# Patient Record
Sex: FEMALE | Race: WHITE | NOT HISPANIC OR LATINO | Employment: OTHER | URBAN - METROPOLITAN AREA
[De-identification: names, ages, dates, MRNs, and addresses within clinical notes are randomized per-mention and may not be internally consistent; named-entity substitution may affect disease eponyms.]

---

## 2022-07-11 PROBLEM — E78.5 DYSLIPIDEMIA: Status: ACTIVE | Noted: 2022-07-11

## 2022-07-11 PROBLEM — I10 ESSENTIAL HYPERTENSION: Status: ACTIVE | Noted: 2022-07-11

## 2022-07-11 PROBLEM — I48.0 AF (PAROXYSMAL ATRIAL FIBRILLATION): Status: ACTIVE | Noted: 2022-07-11

## 2022-08-12 ENCOUNTER — OFFICE VISIT (OUTPATIENT)
Dept: CARDIOLOGY | Facility: CLINIC | Age: 87
End: 2022-08-12

## 2022-08-12 VITALS
SYSTOLIC BLOOD PRESSURE: 142 MMHG | WEIGHT: 120.2 LBS | DIASTOLIC BLOOD PRESSURE: 72 MMHG | HEIGHT: 61 IN | HEART RATE: 81 BPM | BODY MASS INDEX: 22.69 KG/M2 | OXYGEN SATURATION: 98 %

## 2022-08-12 DIAGNOSIS — I10 ESSENTIAL HYPERTENSION: ICD-10-CM

## 2022-08-12 DIAGNOSIS — I48.0 AF (PAROXYSMAL ATRIAL FIBRILLATION): Primary | ICD-10-CM

## 2022-08-12 DIAGNOSIS — E78.5 DYSLIPIDEMIA: ICD-10-CM

## 2022-08-12 PROCEDURE — 93000 ELECTROCARDIOGRAM COMPLETE: CPT | Performed by: PHYSICIAN ASSISTANT

## 2022-08-12 PROCEDURE — 99203 OFFICE O/P NEW LOW 30 MIN: CPT | Performed by: PHYSICIAN ASSISTANT

## 2022-08-12 RX ORDER — CETIRIZINE HYDROCHLORIDE 10 MG/1
10 TABLET ORAL DAILY
COMMUNITY

## 2022-08-12 RX ORDER — ANTIOX #8/OM3/DHA/EPA/LUT/ZEAX 250-2.5 MG
1 CAPSULE ORAL 2 TIMES DAILY
COMMUNITY

## 2022-08-12 RX ORDER — HYDROCHLOROTHIAZIDE 25 MG/1
25 TABLET ORAL DAILY
COMMUNITY

## 2022-08-12 RX ORDER — LORAZEPAM 0.5 MG
1 TABLET ORAL 2 TIMES DAILY
COMMUNITY

## 2022-08-12 NOTE — PROGRESS NOTES
[facility] at [facility]  INITIAL OFFICE CONSULT      Bouchra Chapin  5/25/1920  PCP: Provider, No Known    SUBJECTIVE:   Bouchra Chapin is a 89 y.o. female seen for a consultation visit regarding the following:     Chief Complaint:   Chief Complaint   Patient presents with   • New Onset Atrial Fibrillation          Consultation is requested by Ivy Cooper,* for evaluation of New Onset Atrial Fibrillation        History:  Delightful 89-year-old female he was originally scheduled to see Dr. Willis because of scheduling conflict she was worked in our clinic today.  She is accompanied by her daughter.  She was in her primary care provider's office recently and an EKG was completed that was reported as atrial fibrillation.  The patient is unaware of any symptoms with atrial fibrillation.  She denies any palpitations dizziness near syncope or syncope.  The patient is hard of hearing and has hearing aids but does not always comprehend everything and her daughter answers a lot of questions.  In summary the patient overall is feeling fine she has no symptoms.  She denies chest pain or chest tightness.  She denies heart failure symptoms.  She is never had any previous cardiac history.      Cardiac PMH: (Old records have been reviewed and summarized below)  1. Abnormal EKG, difficult to interpret EKG reveals sinus with PACs possibly brief run of atrial fibrillation.  2. HTN  3. HLD  4. Hearing loss  5. Venous insufficiency, varicose veins        Past Medical History, Past Surgical History, Family history, Social History, and Medications were all reviewed with the patient today and updated as necessary.     Current Outpatient Medications   Medication Sig Dispense Refill   • cetirizine (zyrTEC) 10 MG tablet Take 10 mg by mouth Daily.     • hydroCHLOROthiazide (HYDRODIURIL) 25 MG tablet Take 25 mg by mouth Daily.     • multivitamins-minerals (PRESERVISION AREDS 2) capsule capsule Take 1  "capsule by mouth 2 (Two) Times a Day.     • Omega-3-6-9 capsule Take 1 capsule by mouth 2 (Two) Times a Day.       No current facility-administered medications for this visit.     Allergies   Allergen Reactions   • Penicillins Swelling   • Sulfa Antibiotics Nausea And Vomiting         Past Medical History:   Diagnosis Date   • Atrial fibrillation (HCC)    • Edema    • Hyperlipidemia      History reviewed. No pertinent surgical history.  History reviewed. No pertinent family history.  Social History     Tobacco Use   • Smoking status: Never Smoker   • Smokeless tobacco: Never Used   Substance Use Topics   • Alcohol use: Never       ROS:  Review of Symptoms:  General: Positive fatigue  Skin: no rashes, lumps, or other skin changes  HEENT: Rare dizziness, lightheadedness, or vision changes  Respiratory: no cough or hemoptysis  Cardiovascular: no palpitations, and tachycardia  Gastrointestinal: no black/tarry stools or diarrhea  Urinary: Some incontinence  Peripheral Vascular: no claudication or leg cramps  Musculoskeletal: Arthritis  Psychiatric: no depression or excessive stress  Neurological: no sensory or motor loss, no syncope  Hematologic: no anemia, easy bruising or bleeding  Endocrine: no thyroid problems, nor heat or cold intolerance         PHYSICAL EXAM:   /72 (BP Location: Right arm, Patient Position: Sitting, Cuff Size: Adult)   Pulse 81   Ht 154.9 cm (61\")   Wt 54.5 kg (120 lb 3.2 oz)   SpO2 98%   BMI 22.71 kg/m²      Wt Readings from Last 5 Encounters:   08/12/22 54.5 kg (120 lb 3.2 oz)     BP Readings from Last 5 Encounters:   08/12/22 142/72       General-Well Nourished, Well developed  Eyes - PERRLA  Neck- supple, No mass  CV- regular rate and rhythm, no MRG  Lung- clear bilaterally  Abd- soft, +BS  Musc/skel - Norm strength and range of motion  Skin- warm and dry  Neuro - Alert & Oriented x 3, appropriate mood.    Patient's external notes were reviewed.  Independent interpretation of test " performed by another physician in facility were reviewed.  Outside laboratory data was also reviewed.    Medical problems and test results were reviewed with the patient today.     No results found for this or any previous visit.      No results found for: CHOL, HDL, HDLC, LDL, LDLC, VLDL    EKG:  (EKG/Tracing has been independently visualized by me and summarized below)      ECG 12 Lead    Date/Time: 8/12/2022 2:50 PM  Performed by: Clem Ward PA  Authorized by: Clem Ward PA   Rhythm: sinus rhythm  Ectopy: atrial premature contractions  Rate: normal  Conduction: conduction normal  ST Segments: ST segments normal  T Waves: T waves normal  QRS axis: normal  Other: no other findings    Clinical impression: abnormal EKG            ASSESSMENT   1.  Abnormal EKG reviewed multiple EKGs from PCPs office.  Difficult to interpret because of artifact appears to be sinus with PACs.'s possible one of the EKGs revealed short runs of atrial fibrillation.  EKG today in our office reveals normal sinus rhythm with frequent PACs.  The patient is completely asymptomatic.  She has no chest pain palpitations or worsening shortness of breath.  2.  Anticoagulation: Discussed in detail with the patient's daughter risk of anticoagulation and benefits.  She declines at this time does not wish to pursue anticoagulation regarding her risk for stroke with A. Fib.  3.  Lower extreme edema, this appears to be venous insufficiency, varicose veins.  Recommend elevate feet take extra half dose hydrochlorothiazide as needed.      PLAN  · Continue current medical therapy  · Return follow-up or office as needed basis.          Cardiology/Electrophysiology  08/12/22  13:58 EDT  Will Ed HOWARD